# Patient Record
Sex: MALE | Race: WHITE
[De-identification: names, ages, dates, MRNs, and addresses within clinical notes are randomized per-mention and may not be internally consistent; named-entity substitution may affect disease eponyms.]

---

## 2018-11-14 ENCOUNTER — HOSPITAL ENCOUNTER (EMERGENCY)
Dept: HOSPITAL 53 - M ED | Age: 23
Discharge: HOME | End: 2018-11-14
Payer: COMMERCIAL

## 2018-11-14 DIAGNOSIS — J98.2: Primary | ICD-10-CM

## 2018-11-14 DIAGNOSIS — Z88.0: ICD-10-CM

## 2018-12-03 ENCOUNTER — HOSPITAL ENCOUNTER (OUTPATIENT)
Dept: HOSPITAL 53 - M SMT | Age: 23
End: 2018-12-03
Attending: THORACIC SURGERY (CARDIOTHORACIC VASCULAR SURGERY)
Payer: COMMERCIAL

## 2018-12-03 PROCEDURE — 71046 X-RAY EXAM CHEST 2 VIEWS: CPT

## 2019-04-10 ENCOUNTER — HOSPITAL ENCOUNTER (OUTPATIENT)
Dept: HOSPITAL 53 - M RAD | Age: 24
End: 2019-04-10
Attending: FAMILY MEDICINE
Payer: COMMERCIAL

## 2019-04-10 DIAGNOSIS — R07.89: Primary | ICD-10-CM

## 2019-04-10 DIAGNOSIS — Z87.09: ICD-10-CM

## 2019-04-11 NOTE — REP
Clinical:  Chest wall pain. History of pneumomediastinum.

 

Technique:  Axial noncontrast images from the thoracic inlet to the upper abdomen

with coronal and sagittal re-formations.

 

Comparison:  11/14/2018.

 

Findings:

The bilateral lung fields are well-aerated, symmetric, and clear.  No

consolidation or atelectasis appreciated.  No pleural effusion.  No pneumothorax.

Tracheobronchial tree is patent and without bronchiectasis.  Mediastinum

demonstrates normal thoracic aorta, pulmonary vasculature, and heart/pericardium.

There is no evidence for pneumomediastinum.  Surrounding osseous structures are

intact and normal.

 

Impression:

Normal noncontrast chest CT.

 

 

Electronically Signed by

Nelson Tineo MD 04/11/2019 04:05 A